# Patient Record
Sex: MALE | Race: WHITE | NOT HISPANIC OR LATINO | Employment: OTHER | URBAN - METROPOLITAN AREA
[De-identification: names, ages, dates, MRNs, and addresses within clinical notes are randomized per-mention and may not be internally consistent; named-entity substitution may affect disease eponyms.]

---

## 2019-05-22 ENCOUNTER — HOSPITAL ENCOUNTER (OUTPATIENT)
Dept: CARDIOLOGY | Facility: HOSPITAL | Age: 59
Discharge: HOME | End: 2019-05-22
Admitting: PHYSICIAN ASSISTANT

## 2019-05-22 VITALS — BODY MASS INDEX: 21.87 KG/M2 | HEIGHT: 73 IN | WEIGHT: 165 LBS

## 2019-05-22 DIAGNOSIS — H54.7 LOSS OF VISION: Primary | ICD-10-CM

## 2019-05-22 DIAGNOSIS — H54.7 LOSS OF VISION: ICD-10-CM

## 2019-05-22 LAB
BH CV ECHO MEAS - BSA(HAYCOCK): 2 M^2
BH CV ECHO MEAS - BSA: 2 M^2
BH CV ECHO MEAS - BZI_BMI: 21.8 KILOGRAMS/M^2
BH CV ECHO MEAS - BZI_METRIC_HEIGHT: 185.4 CM
BH CV ECHO MEAS - BZI_METRIC_WEIGHT: 74.8 KG
BH CV XLRA MEAS LEFT CCA RATIO VEL: 85.4 CM/SEC
BH CV XLRA MEAS LEFT DIST CCA EDV: 25.5 CM/SEC
BH CV XLRA MEAS LEFT DIST CCA PSV: 86.4 CM/SEC
BH CV XLRA MEAS LEFT DIST ICA EDV: 23.2 CM/SEC
BH CV XLRA MEAS LEFT DIST ICA PSV: 62.9 CM/SEC
BH CV XLRA MEAS LEFT ICA RATIO VEL: 69.5 CM/SEC
BH CV XLRA MEAS LEFT ICA/CCA RATIO: 0.81
BH CV XLRA MEAS LEFT MID CCA EDV: 26.5 CM/SEC
BH CV XLRA MEAS LEFT MID CCA PSV: 104.1 CM/SEC
BH CV XLRA MEAS LEFT MID ICA EDV: 21 CM/SEC
BH CV XLRA MEAS LEFT MID ICA PSV: 70 CM/SEC
BH CV XLRA MEAS LEFT PROX CCA EDV: 29.5 CM/SEC
BH CV XLRA MEAS LEFT PROX CCA PSV: 117.9 CM/SEC
BH CV XLRA MEAS LEFT PROX ECA PSV: 100.4 CM/SEC
BH CV XLRA MEAS LEFT PROX ICA EDV: 21.5 CM/SEC
BH CV XLRA MEAS LEFT PROX ICA PSV: 62.9 CM/SEC
BH CV XLRA MEAS LEFT PROX SCLA PSV: 150.1 CM/SEC
BH CV XLRA MEAS LEFT VERTEBRAL A EDV: 10.5 CM/SEC
BH CV XLRA MEAS LEFT VERTEBRAL A PSV: 49.6 CM/SEC
BH CV XLRA MEAS RIGHT CCA RATIO VEL: 77.9 CM/SEC
BH CV XLRA MEAS RIGHT DIST CCA EDV: 20.1 CM/SEC
BH CV XLRA MEAS RIGHT DIST CCA PSV: 78.6 CM/SEC
BH CV XLRA MEAS RIGHT DIST ICA EDV: 25.8 CM/SEC
BH CV XLRA MEAS RIGHT DIST ICA PSV: 70.4 CM/SEC
BH CV XLRA MEAS RIGHT ICA RATIO VEL: 81.7 CM/SEC
BH CV XLRA MEAS RIGHT ICA/CCA RATIO: 1
BH CV XLRA MEAS RIGHT MID CCA EDV: 22.7 CM/SEC
BH CV XLRA MEAS RIGHT MID CCA PSV: 87.3 CM/SEC
BH CV XLRA MEAS RIGHT MID ICA EDV: 26.4 CM/SEC
BH CV XLRA MEAS RIGHT MID ICA PSV: 66.6 CM/SEC
BH CV XLRA MEAS RIGHT PROX CCA EDV: 21 CM/SEC
BH CV XLRA MEAS RIGHT PROX CCA PSV: 101.3 CM/SEC
BH CV XLRA MEAS RIGHT PROX ECA PSV: 108.1 CM/SEC
BH CV XLRA MEAS RIGHT PROX ICA EDV: 23.9 CM/SEC
BH CV XLRA MEAS RIGHT PROX ICA PSV: 82.3 CM/SEC
BH CV XLRA MEAS RIGHT PROX SCLA PSV: 154.5 CM/SEC
BH CV XLRA MEAS RIGHT VERTEBRAL A EDV: 16.3 CM/SEC
BH CV XLRA MEAS RIGHT VERTEBRAL A PSV: 46.5 CM/SEC
LEFT ARM BP: NORMAL MMHG
RIGHT ARM BP: NORMAL MMHG

## 2019-05-22 PROCEDURE — 93880 EXTRACRANIAL BILAT STUDY: CPT

## 2019-05-22 PROCEDURE — 93880 EXTRACRANIAL BILAT STUDY: CPT | Performed by: INTERNAL MEDICINE

## 2021-01-14 ENCOUNTER — IMMUNIZATION (OUTPATIENT)
Dept: VACCINE CLINIC | Facility: HOSPITAL | Age: 61
End: 2021-01-14

## 2021-01-14 PROCEDURE — 91300 HC SARSCOV02 VAC 30MCG/0.3ML IM: CPT | Performed by: FAMILY MEDICINE

## 2021-01-14 PROCEDURE — 0001A: CPT | Performed by: FAMILY MEDICINE

## 2021-02-04 ENCOUNTER — APPOINTMENT (OUTPATIENT)
Dept: VACCINE CLINIC | Facility: HOSPITAL | Age: 61
End: 2021-02-04

## 2021-02-18 ENCOUNTER — IMMUNIZATION (OUTPATIENT)
Dept: VACCINE CLINIC | Facility: HOSPITAL | Age: 61
End: 2021-02-18

## 2021-02-18 PROCEDURE — 0002A: CPT | Performed by: INTERNAL MEDICINE

## 2021-02-18 PROCEDURE — 91300 HC SARSCOV02 VAC 30MCG/0.3ML IM: CPT | Performed by: INTERNAL MEDICINE

## 2025-03-17 ENCOUNTER — TELEPHONE (OUTPATIENT)
Dept: GENETICS | Facility: HOSPITAL | Age: 65
End: 2025-03-17
Payer: MEDICARE

## 2025-03-17 NOTE — TELEPHONE ENCOUNTER
Called the patient regarding the fax-in genetic counseling referral from Dr. Eddy's office. Left a message on the voicemail

## 2025-04-01 ENCOUNTER — CLINICAL SUPPORT (OUTPATIENT)
Facility: HOSPITAL | Age: 65
End: 2025-04-01
Payer: MEDICARE

## 2025-04-01 DIAGNOSIS — Z80.3 FAMILY HISTORY OF MALIGNANT NEOPLASM OF BREAST: ICD-10-CM

## 2025-04-01 DIAGNOSIS — Z80.0 FAMILY HISTORY OF PANCREATIC CANCER: ICD-10-CM

## 2025-04-01 DIAGNOSIS — Z80.42 FAMILY HISTORY OF MALIGNANT NEOPLASM OF PROSTATE: ICD-10-CM

## 2025-04-01 DIAGNOSIS — Z80.0 FAMILY HISTORY OF COLON CANCER: ICD-10-CM

## 2025-04-01 DIAGNOSIS — Z13.79 GENETIC TESTING: Primary | ICD-10-CM

## 2025-04-01 PROCEDURE — 96041 GENETIC COUNSELING SVC EA 30: CPT | Performed by: GENETIC COUNSELOR, MS

## 2025-04-01 NOTE — PROGRESS NOTES
Yovani Gorman, a 64 y.o. male, was referred for genetic counseling due to a family history of pancreatic cancer. His current cancer screening includes colonoscopy every five years and he does not report a personal history of colon polyps. He was interested in discussing his risk for a hereditary cancer syndrome. Mr. Gorman was interested in pursuing a multigene panel, and therefore the CancerNext-Expanded panel was ordered through [employer] which analyzes BRCA1/2 and 74 additional genes associated with an increased cancer risk.     FAMILY HISTORY:  Sister:  Breast cancer, 63  Father:  Colon cancer, 75; Pancreatic cancer, 87; Prostate cancer    We do not have medical records confirming the diagnoses in Mr. Gorman's family.    RISK ASSESSMENT: Mr. Gorman's family history of cancer led to concern regarding a hereditary cancer syndrome.  He meets NCCN guidelines criteria for BRCA1/2 testing based on his family history of pancreatic cancer diagnosed in a first-degree relative. The standard approach to genetic testing is through a multigene panel. If genetic testing is negative, Mr. Delarosas management should be guided by family history.     GENETIC COUNSELING:  We reviewed the family history information in detail.  Cases of cancer follow three general patterns: sporadic, familial, and hereditary.  While most cancer is sporadic, some cases appear to occur in family clusters.  These cases are said to be familial and account for 10-20% of certain cancer cases.  Familial cases may be due to a combination of shared genes and environmental factors among family members.  In even fewer families (~10%), the cancer is said to be inherited, and the genes responsible for the cancer are known.      Family histories typical of hereditary cancer syndromes usually include multiple first- and second-degree relatives diagnosed with cancer types that define a syndrome.  These cases tend to be diagnosed at  younger-than-expected ages and can be bilateral or multifocal.  The cancer in these families follows an autosomal dominant inheritance pattern, which indicates the likely presence of a mutation in a cancer susceptibility gene.  Children and siblings of an individual believed to carry this mutation have a 50% chance of inheriting that mutation, thereby inheriting the increased risk to develop cancer.  These mutations can be passed down from the maternal or the paternal lineage.    Hereditary pancreatic cancer accounts for approximately 10% of all cases of pancreatic cancer.   The gene most often associated with a family history of pancreatic cancer is BRCA2. Mutations in BRCA2 confer up to a 7% risk for pancreatic cancer. Mutations in BRCA1 and BRCA2 also confer an increased risk for breast cancer, ovarian cancer, male breast cancer, and prostate cancer. Griffith syndrome is a hereditary colon cancer syndrome that is associated with pancreatic cancer.  CDKN2A and CDK4 are associated with increased risks for pancreatic cancer and melanoma.      The standard approach to genetic testing is via a multigene panel.  Genes included on these panels have varying degrees of risk associated, and management and screening guidelines vary based on the specific gene.  Hereditary cancer syndromes can demonstrate incomplete penetrance and variable expression within families. There are genes that are evaluated that have been more recently described, and there may be less data regarding the risks and therefore may not have established management guidelines at this time. We discussed the possibility of results that are unexpected based on family history. Based on Mr. Gorman's family history and his desire to get more information regarding his personal risks and risks for his family, he opted to pursue testing through a panel evaluating several other genes known to increase the risk for cancer.     GENETIC TESTING:  The risks, benefits  and limitations of genetic testing and implications for clinical management following testing were reviewed. DNA test results can influence decisions regarding screening and prevention.  Genetic testing can have significant psychological implications for both individuals and families. Also discussed was the possibility of employment and insurance discrimination based on genetic test results and the federal and states laws that are in place to prevent this (JEREMIAH), as well as the limitations of these laws.         We discussed multigene panel testing, which would involve testing several genes associated with an increased cancer risk. The benefits and limitations of genetic testing were discussed and Mr. Gorman decided to pursue testing of the genes via the panel. The implications of a positive or negative test result were discussed.  We also discussed the importance of testing on an affected relative.  In cases where an affected relative is not available for testing or not willing to pursue testing, it is appropriate to offer testing to an unaffected individual. We discussed the possibility that, in some cases, genetic test results may be ambiguous due to the identification of a genetic variant of uncertain significance (VUS). These variants may or may not be associated with an increased cancer risk. With multigene panel testing, it is not uncommon for a VUS to be identified.  If a VUS is identified, testing family members is not recommended and screening recommendations are made based on the family history.  The laboratories that perform genetic testing work to reclassify the VUS and send out an amended report if and when a VUS is reclassified.  The majority of variant findings are ultimately reclassified to a negative result. Given his family history, a negative test result does not eliminate all cancer risk, although the risk would not be as high as it would with positive genetic testing.     PLAN:  Genetic  testing via the CancerNext-Expanded panel through [facility] was ordered. A blood sample was collected today 4/1/2025. Results are expected in 2-3 weeks and we will contact Mr. Gorman  with his results once they are received. He can contact us at [phone] with any questions in the meantime.       Heydi Garcias MS, [facility] [place], [facility]  Licensed Certified Genetic Counselor        Total time spent caring for the patient today was 30 minutes. This time includes chart review, time spent during the visit, and time spent after the visit on documentation and follow-up.

## 2025-04-02 ENCOUNTER — LAB (OUTPATIENT)
Facility: HOSPITAL | Age: 65
End: 2025-04-02
Payer: MEDICARE

## 2025-04-02 DIAGNOSIS — Z80.0 FAMILY HISTORY OF MALIGNANT NEOPLASM OF GASTROINTESTINAL TRACT: Primary | ICD-10-CM

## 2025-04-02 PROCEDURE — 36415 COLL VENOUS BLD VENIPUNCTURE: CPT

## 2025-05-08 ENCOUNTER — TELEPHONE (OUTPATIENT)
Dept: GENETICS | Facility: HOSPITAL | Age: 65
End: 2025-05-08
Payer: MEDICARE

## 2025-05-14 ENCOUNTER — TELEPHONE (OUTPATIENT)
Dept: GENETICS | Facility: HOSPITAL | Age: 65
End: 2025-05-14
Payer: MEDICARE

## 2025-05-14 ENCOUNTER — DOCUMENTATION (OUTPATIENT)
Dept: GENETICS | Facility: HOSPITAL | Age: 65
End: 2025-05-14
Payer: MEDICARE

## 2025-05-14 NOTE — TELEPHONE ENCOUNTER
Spoke with patient and disclosed negative genetic results. Informed patient these results would be on [facility] and sent to his Dr. Patient declined needing a copy mailed to him.

## 2025-05-19 NOTE — PROGRESS NOTES
Yovani Gorman, a 64 y.o. male, was referred for genetic counseling due to a family history of pancreatic cancer. His current cancer screening includes colonoscopy every five years and he does not report a personal history of colon polyps. He was interested in discussing his risk for a hereditary cancer syndrome. Mr. Gorman was interested in pursuing a multigene panel, and therefore the CancerNext-Expanded panel was ordered through [employer] which analyzes BRCA1/2 and 74 additional genes associated with an increased cancer risk. The genes on this panel include AIP, ALK, APC, ZITA, AXIN2, BAP1, BARD1, BMPR1A, BRCA1, BRCA2, BRIP1, CDC73, CDH1, CDK4, CDKN1B, CDKN2A, CEBPA, CHEK2, CTNNA1, DDX41, DICER1, EGFR, EPCAM, ETV6, FH, FLCN, GATA2, GREM1, HOXB13, KIT, LZTR1, MAX, MBD4, MEN1, MET, MITF, MLH1, MSH2, MSH3, MSH6, MUTYH, NF1, NF2, NTHL1, PALB2, PDGFRA, PHOX2B, PMS2, POLD1, POLE, POT1, CEIKC8P, PTCH1, PTEN, RAD51C, RAD51D, RB1, RET, RUNX1, SDHA, SDHAF2,  SDHB, SDHC, SDHD, SMAD4, SMARCA4, SMARCB1, SMARCE1, STK11, SUFU, ZMHY508, TP53, TSC1, TSC2, VHL, and WT1. Genetic testing was negative for pathogenic mutations in BRCA1/2 and 74 additional genes included on the CancerNext-Expanded panel. These results were discussed with Mr. Gorman by telephone on 5/14/2025.    FAMILY HISTORY:  Sister:              Breast cancer, 63 (reported negative genetic testing about three years ago)  Father:             Colon cancer, 75; Pancreatic cancer, 87; Prostate cancer     We do not have medical records confirming the diagnoses in Mr. Gorman's family.     RISK ASSESSMENT: Mr. Gorman's family history of cancer led to concern regarding a hereditary cancer syndrome.  He meets NCCN guidelines criteria for BRCA1/2 testing based on his family history of pancreatic cancer diagnosed in a first-degree relative. The standard approach to genetic testing is through a multigene panel. If genetic testing is negative, Mr. Quinones  management should be guided by family history.      GENETIC COUNSELING:  We reviewed the family history information in detail.  Cases of cancer follow three general patterns: sporadic, familial, and hereditary.  While most cancer is sporadic, some cases appear to occur in family clusters.  These cases are said to be familial and account for 10-20% of certain cancer cases.  Familial cases may be due to a combination of shared genes and environmental factors among family members.  In even fewer families (~10%), the cancer is said to be inherited, and the genes responsible for the cancer are known.       Family histories typical of hereditary cancer syndromes usually include multiple first- and second-degree relatives diagnosed with cancer types that define a syndrome.  These cases tend to be diagnosed at younger-than-expected ages and can be bilateral or multifocal.  The cancer in these families follows an autosomal dominant inheritance pattern, which indicates the likely presence of a mutation in a cancer susceptibility gene.  Children and siblings of an individual believed to carry this mutation have a 50% chance of inheriting that mutation, thereby inheriting the increased risk to develop cancer.  These mutations can be passed down from the maternal or the paternal lineage.     Hereditary pancreatic cancer accounts for approximately 10% of all cases of pancreatic cancer.   The gene most often associated with a family history of pancreatic cancer is BRCA2. Mutations in BRCA2 confer up to a 7% risk for pancreatic cancer. Mutations in BRCA1 and BRCA2 also confer an increased risk for breast cancer, ovarian cancer, male breast cancer, and prostate cancer. Griffith syndrome is a hereditary colon cancer syndrome that is associated with pancreatic cancer.  CDKN2A and CDK4 are associated with increased risks for pancreatic cancer and melanoma.       The standard approach to genetic testing is via a multigene panel.  Genes  included on these panels have varying degrees of risk associated, and management and screening guidelines vary based on the specific gene.  Hereditary cancer syndromes can demonstrate incomplete penetrance and variable expression within families. There are genes that are evaluated that have been more recently described, and there may be less data regarding the risks and therefore may not have established management guidelines at this time. We discussed the possibility of results that are unexpected based on family history. Based on Mr. Gorman's family history and his desire to get more information regarding his personal risks and risks for his family, he opted to pursue testing through a panel evaluating several other genes known to increase the risk for cancer.      GENETIC TESTING:  The risks, benefits and limitations of genetic testing and implications for clinical management following testing were reviewed. DNA test results can influence decisions regarding screening and prevention.  Genetic testing can have significant psychological implications for both individuals and families. Also discussed was the possibility of employment and insurance discrimination based on genetic test results and the federal and states laws that are in place to prevent this (JEREMIAH), as well as the limitations of these laws.         We discussed multigene panel testing, which would involve testing several genes associated with an increased cancer risk. The benefits and limitations of genetic testing were discussed and Mr. Gorman decided to pursue testing of the genes via the panel. The implications of a positive or negative test result were discussed.  We also discussed the importance of testing on an affected relative.  In cases where an affected relative is not available for testing or not willing to pursue testing, it is appropriate to offer testing to an unaffected individual. We discussed the possibility that, in some cases,  genetic test results may be ambiguous due to the identification of a genetic variant of uncertain significance (VUS). These variants may or may not be associated with an increased cancer risk. With multigene panel testing, it is not uncommon for a VUS to be identified.  If a VUS is identified, testing family members is not recommended and screening recommendations are made based on the family history.  The laboratories that perform genetic testing work to reclassify the VUS and send out an amended report if and when a VUS is reclassified.  The majority of variant findings are ultimately reclassified to a negative result. Given his family history, a negative test result does not eliminate all cancer risk, although the risk would not be as high as it would with positive genetic testing.     TEST RESULTS:  Genetic testing was negative for pathogenic mutations by sequencing and rearrangement testing for the 76 genes on the CancerNext-Expanded panel.  The negative result greatly lowers the risk of a hereditary cancer syndrome for Mr. Gorman.  It is possible that the family history is due to a hereditary cancer syndrome which Mr. Gorman did not happen to inherit. RNA analysis was not performed but this does not impact the DNA results. This assessment is based on the information provided at the time of the consultation.    CLINICAL MANAGEMENT GUIDELINES: Current NCCN guidelines recommend that individuals who have a first-degree relative diagnosed with colon cancer at any age should begin colonoscopy screening at age 40 or ten years before the earliest diagnosis of colorectal cancer in the family, whichever is earliest, and repeat every five years or more frequently based on personal clinical findings.      PLAN:  Genetic counseling remains available to Mr. Gorman and he can contact us at [phone] with any questions.         Heydi Garcias, MS, [facility] [place], [facility]  Licensed Certified Genetic Counselor      Cc:  Cholo  MD Surjit